# Patient Record
Sex: FEMALE | Race: WHITE | URBAN - METROPOLITAN AREA
[De-identification: names, ages, dates, MRNs, and addresses within clinical notes are randomized per-mention and may not be internally consistent; named-entity substitution may affect disease eponyms.]

---

## 2019-06-09 ENCOUNTER — OFFICE VISIT (OUTPATIENT)
Dept: URGENT CARE | Facility: URGENT CARE | Age: 54
End: 2019-06-09
Payer: COMMERCIAL

## 2019-06-09 ENCOUNTER — ANCILLARY PROCEDURE (OUTPATIENT)
Dept: GENERAL RADIOLOGY | Facility: CLINIC | Age: 54
End: 2019-06-09
Attending: PHYSICIAN ASSISTANT
Payer: COMMERCIAL

## 2019-06-09 VITALS
HEART RATE: 74 BPM | TEMPERATURE: 98.1 F | SYSTOLIC BLOOD PRESSURE: 143 MMHG | RESPIRATION RATE: 12 BRPM | WEIGHT: 157 LBS | DIASTOLIC BLOOD PRESSURE: 93 MMHG | OXYGEN SATURATION: 98 %

## 2019-06-09 DIAGNOSIS — S99.921A INJURY OF GREAT TOE, RIGHT, INITIAL ENCOUNTER: Primary | ICD-10-CM

## 2019-06-09 DIAGNOSIS — S99.921A INJURY OF GREAT TOE, RIGHT, INITIAL ENCOUNTER: ICD-10-CM

## 2019-06-09 DIAGNOSIS — S90.111A CONTUSION OF RIGHT GREAT TOE WITHOUT DAMAGE TO NAIL, INITIAL ENCOUNTER: ICD-10-CM

## 2019-06-09 PROCEDURE — 73660 X-RAY EXAM OF TOE(S): CPT | Mod: RT

## 2019-06-09 PROCEDURE — 99213 OFFICE O/P EST LOW 20 MIN: CPT | Performed by: PHYSICIAN ASSISTANT

## 2019-06-09 NOTE — PROGRESS NOTES
S: 53-year-old female presents for right great toe injury last night.  She was walking and caught it on a stump.  No numbness or tingling.  There is swelling and bruising.  She has broken other toes in the past.  No fever.  Juan F taping does seem to help.  She has a special support shoe from prior toe fracture that she has been wearing.        Allergies   Allergen Reactions     Hydrochlorothiazide      PN: skin turned purple in the sun       History reviewed. No pertinent past medical history.  Past medical history-no history of gout  Family medical history-no gout or diabetes    Current Outpatient Medications on File Prior to Visit:  lisinopril (PRINIVIL,ZESTRIL) 20 MG tablet      No current facility-administered medications on file prior to visit.     Social History     Tobacco Use     Smoking status: Never Smoker     Smokeless tobacco: Never Used   Substance Use Topics     Alcohol use: Not on file       ROS:  CONSTITUTIONAL: Negative for fatigue or fever.  EYES: Negative for eye problems.  ENT: Negative   RESP: Negative   CV: Negative for chest pains.  GI: Negative for vomiting.  MUSCULOSKELETAL: As above   NEUROLOGIC: Negative for headaches.  SKIN: Negative for rash.  Psych-normal mentation    OBJECTIVE:  BP (!) 143/93   Pulse 74   Temp 98.1  F (36.7  C) (Oral)   Resp 12   Wt 71.2 kg (157 lb)   SpO2 98%   GENERAL APPEARANCE: Healthy, alert and no distress.  EYES:Conjunctiva/sclera clear.  NECK: Supple, nontender, no lymphadenopathy.  RESP: Lungs clear to auscultation - no rales, rhonchi or wheezes  CV: Regular rate and rhythm, normal S1 S2, no murmur noted.  NEURO: Awake, alert    SKIN:   Right great toe is with swelling and ecchymosis.    Is able to wiggle it.  Good capillary refill.  She is tender to palpation over the PIP joint.  Sensation to soft touch intact is very    Study Result     TOE(S) TWO-THREE VIEWS RIGHT  6/9/2019 9:26 AM      HISTORY: Injury of great toe, right, initial  encounter     COMPARISON: None.                                                                      IMPRESSION: No acute appearing fracture dislocation. Minimal  degenerative change proximal interphalangeal joint great toe.     SAMMIE STEPHENS MD         ASSESSMENT:     ICD-10-CM    1. Injury of great toe, right, initial encounter S99.921A CANCELED: XR Toe Left G/E 2 Views   2. Contusion of right great toe without damage to nail, initial encounter S90.111A            PLAN: Juan F tape, ice, Ibu, elevate. F/U PCP 3 weeks if not better  Lots of rest and fluids.  RTC if any worsening symptoms or if not improving.    Maria E Doran PA-C